# Patient Record
Sex: FEMALE | Race: ASIAN | NOT HISPANIC OR LATINO | URBAN - METROPOLITAN AREA
[De-identification: names, ages, dates, MRNs, and addresses within clinical notes are randomized per-mention and may not be internally consistent; named-entity substitution may affect disease eponyms.]

---

## 2018-07-01 ENCOUNTER — OUTPATIENT (OUTPATIENT)
Dept: OUTPATIENT SERVICES | Facility: HOSPITAL | Age: 73
LOS: 1 days | End: 2018-07-01
Payer: MEDICARE

## 2018-07-01 PROCEDURE — G9001: CPT

## 2018-07-12 ENCOUNTER — EMERGENCY (EMERGENCY)
Facility: HOSPITAL | Age: 73
LOS: 1 days | Discharge: HOME | End: 2018-07-12
Attending: EMERGENCY MEDICINE
Payer: MEDICARE

## 2018-07-12 VITALS
RESPIRATION RATE: 20 BRPM | DIASTOLIC BLOOD PRESSURE: 74 MMHG | SYSTOLIC BLOOD PRESSURE: 124 MMHG | HEART RATE: 64 BPM | OXYGEN SATURATION: 95 %

## 2018-07-12 VITALS
HEART RATE: 76 BPM | OXYGEN SATURATION: 98 % | TEMPERATURE: 99 F | DIASTOLIC BLOOD PRESSURE: 73 MMHG | SYSTOLIC BLOOD PRESSURE: 147 MMHG | RESPIRATION RATE: 18 BRPM

## 2018-07-12 PROCEDURE — 99283 EMERGENCY DEPT VISIT LOW MDM: CPT

## 2018-07-12 PROCEDURE — 99284 EMERGENCY DEPT VISIT MOD MDM: CPT

## 2018-07-12 PROCEDURE — 73610 X-RAY EXAM OF ANKLE: CPT | Mod: 26,LT

## 2018-07-12 PROCEDURE — 73610 X-RAY EXAM OF ANKLE: CPT

## 2018-07-12 RX ORDER — OXYCODONE HYDROCHLORIDE 5 MG/1
1 TABLET ORAL
Qty: 8 | Refills: 0 | OUTPATIENT
Start: 2018-07-12 | End: 2018-07-13

## 2018-07-12 RX ORDER — ACETAMINOPHEN 500 MG
650 TABLET ORAL ONCE
Qty: 0 | Refills: 0 | Status: COMPLETED | OUTPATIENT
Start: 2018-07-12 | End: 2018-07-12

## 2018-07-12 RX ADMIN — Medication 650 MILLIGRAM(S): at 22:17

## 2018-07-12 NOTE — ED PROVIDER NOTE - PHYSICAL EXAMINATION
GEN: Well Appearing, Nontoxic, NAD  HEENT: NC/AT, Symm Facies. PERRL, EOMI, MMM, posterior pharynx clear  CV: No JVD/Bruits or stridor;  +S1S2, RRR w/o m/g/r  RESP: CTAB w/o w/r/r  ABD: Soft, nt/nd, +BS. No guarding/rebound. No RUQ tender, no CVAT  EXT/MSK: No lower extremity edema or calf tenderness. WWP, palpable pulses. Decreased range of motion of L ankle secondary to pain and swelling  SKIN: No erythema, lesions or rash  Neuro: Grossly intact, AOX3 with normal speech, CN II-XII intact; Sensation intact, motor 5/5 throughout.
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
## 2018-07-12 NOTE — ED PROVIDER NOTE - PLAN OF CARE
1. Keep splint intact until your follow-up. Use crutches and remain nonweightbearing to keep fracture stable until your follow-up.  2. Take tylenol or motrin as directed as needed for pain   3. Follow-up with an orthopedist, phone numbers provided, this week for reevaluation and continued treatment   4. Return to the ER for any new or worsening symptoms

## 2018-07-12 NOTE — ED PROVIDER NOTE - NS ED ROS FT
Constitutional: No fever or chills  Eyes: No visual changes, eye pain or redness  HEENT: No throat pain, ear pain, nasal pain. No nose bleeding.  CV: No chest pain or lower extremity edema  Resp: No SOB no cough  GI: No abd pain. No nausea or vomiting. No diarrhea. No constipation.   : No dysuria, hematuria.   MSK: +L ankle pain with fracture to fibula  Skin: No rash  Neuro: No headache. +minimal tingling to toes of L foot. No weakness.
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
## 2018-07-12 NOTE — ED PROVIDER NOTE - PROGRESS NOTE DETAILS
Spoke with ortho, states bimalleolar equivalent. Looked at skin, no tenting on exam, + 2 dp b/l le. will send outpatient with crutches to f/u with ortho outpatient.

## 2018-07-12 NOTE — ED PROVIDER NOTE - CARE PLAN
Principal Discharge DX:	Fibula fracture  Assessment and plan of treatment:	1. Keep splint intact until your follow-up. Use crutches and remain nonweightbearing to keep fracture stable until your follow-up.  2. Take tylenol or motrin as directed as needed for pain   3. Follow-up with an orthopedist, phone numbers provided, this week for reevaluation and continued treatment   4. Return to the ER for any new or worsening symptoms
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
## 2018-07-24 DIAGNOSIS — Z71.89 OTHER SPECIFIED COUNSELING: ICD-10-CM

## 2022-12-02 NOTE — ED ADULT NURSE NOTE - CAS EDP DISCH TYPE
Spoke to pt and notified dr out appt 12/29/22 and will need to reschedule, notified pt appt 01/04/23 @ 3:20p will await supervisor to update appt to david.   Home

## 2023-03-17 NOTE — ED ADULT NURSE NOTE - MODE OF DISCHARGE
Detail Level: Detailed
Additional Comments: Wound size has decreased to 8x9 mm.  Patient to continue with water/ointment BID and will return to see Dr. Schulte as scheduled.
Body Location Override (Optional - Billing Will Still Be Based On Selected Body Map Location If Applicable): right nasal tip
Ambulatory